# Patient Record
Sex: FEMALE | Race: BLACK OR AFRICAN AMERICAN | Employment: UNEMPLOYED | URBAN - METROPOLITAN AREA
[De-identification: names, ages, dates, MRNs, and addresses within clinical notes are randomized per-mention and may not be internally consistent; named-entity substitution may affect disease eponyms.]

---
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
## 2023-11-07 NOTE — PROGRESS NOTES
Here for postop visit #2 s/p total thyroidectomy , central neck dissection and bilateral 2-5 neck dissection for papillary thyroid cancer    Having slight problem with smile and has to move food around mouth to get it to go down.   Concerned about swelling under chin -feels like has a double chin.   Having some difficulty with opening jaw   Feels lump in throat with swallowing   Has not had a bm but does not always go daily    Slight upward curling of left lip intermittently when smiles or with prolonged talking. Good kiss face and symmetric at rest. Does not happen every times smiles  Appropriate swelling of neck postop. No erythema   Incision c/d/I  HARRIETT drain sites healing well     Will have her see nain ; may have some lymphedema but I think her neck looks good given surgery that she had ; would like to evaluate why she is having swallowing issues. Flex scope at last visit showed normal vocal fold motion   Staples  removed today without issues  Discussed wound care   Will try flexiril to see if this helps with muscle spasms and jaw discomfort. Counseled no to take with narcotic  Miralax or fleets enema   Reviewed expectations for nerve issues and that can take up to a year to improve.   Physical therapy consulted and patient has this scheduled.      This patient is accompanied in the office by her self. Chief Complaint   Patient presents with    Asthma        Visit Vitals  BP 92/62 (BP 1 Location: Left upper arm, BP Patient Position: Sitting)   Pulse 87   Temp 98.6 °F (37 °C) (Oral)   Resp 19   Ht 5' 3.03\" (1.601 m)   Wt 128 lb 3.2 oz (58.2 kg)   SpO2 97%   BMI 22.69 kg/m²          1. Have you been to the ER, urgent care clinic since your last visit? Hospitalized since your last visit? No    2. Have you seen or consulted any other health care providers outside of the [address] [place] [address] since your last visit? Include any pap smears or colon screening.  No

## 2024-09-30 NOTE — PROGRESS NOTES
Chief Complaint   Patient presents with    Immunization/Injection     HPV      Verbal order with read back. Immunization/s administered 4/10/2017 by Calderon Meraz with guardian's consent. Patient tolerated procedure well. No reactions noted.       Visit Vitals    Temp 98.1 °F (36.7 °C) (Oral)    Ht 5' 1.5\" (1.562 m)    Wt 115 lb (52.2 kg)    LMP 03/13/2017 (Exact Date)    BMI 21.38 kg/m2 n/a